# Patient Record
Sex: MALE | Race: ASIAN | Employment: UNEMPLOYED | URBAN - METROPOLITAN AREA
[De-identification: names, ages, dates, MRNs, and addresses within clinical notes are randomized per-mention and may not be internally consistent; named-entity substitution may affect disease eponyms.]

---
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
## 2024-12-05 ENCOUNTER — OFFICE VISIT (OUTPATIENT)
Dept: FAMILY MEDICINE | Facility: CLINIC | Age: 19
End: 2024-12-05
Payer: COMMERCIAL

## 2024-12-05 VITALS
DIASTOLIC BLOOD PRESSURE: 80 MMHG | BODY MASS INDEX: 25.74 KG/M2 | SYSTOLIC BLOOD PRESSURE: 124 MMHG | TEMPERATURE: 97.4 F | HEIGHT: 64 IN | WEIGHT: 150.8 LBS | HEART RATE: 68 BPM | RESPIRATION RATE: 20 BRPM | OXYGEN SATURATION: 99 %

## 2024-12-05 DIAGNOSIS — L70.0 ACNE VULGARIS: Primary | ICD-10-CM

## 2024-12-05 RX ORDER — TRETINOIN 0.25 MG/G
CREAM TOPICAL AT BEDTIME
Qty: 20 G | Refills: 0 | Status: SHIPPED | OUTPATIENT
Start: 2024-12-05

## 2024-12-05 ASSESSMENT — PAIN SCALES - GENERAL: PAINLEVEL_OUTOF10: NO PAIN (0)

## 2024-12-05 NOTE — PROGRESS NOTES
"  Assessment & Plan     Acne vulgaris  Acne is concentrated on face specifically cheek bone area and mild on forehead. Patient reports worse in last 7-8 months. Right cheek bone are has pitting and possible scarring. Started patient on mild acne treatment and advised to find PCP and return in 2 to 3 months for annual wellness visit and have acne treatment assessed.     - tretinoin (RETIN-A) 0.025 % external cream; Apply topically at bedtime.  - benzoyl peroxide 5 % external liquid; Jeison Cadet  - Adult Dermatology  Referral; Future    BMI  Estimated body mass index is 25.88 kg/m  as calculated from the following:    Height as of this encounter: 1.626 m (5' 4\").    Weight as of this encounter: 68.4 kg (150 lb 12.8 oz).     CONSULTATION/REFERRAL to Dermatology    Subjective   Jeison is a 19 year old, presenting for the following health issues:  Acne      12/5/2024     2:51 PM   Additional Questions   Roomed by Christy BASHIR CMA     Acne is concentrated on face specifically cheek bone area and mild on forehead. Patient reports worse in last 7-8 months. Right cheek bone are has pitting and possible scarring. Patient reports he sometimes itches acne. Started patient on mild acne treatment and advised to find PCP and return in 2 to 3 months for annual wellness visit and have acne treatment assessed. Patient denies headache, pain, and nausea.       History of Present Illness       Reason for visit:  Acne-face  What makes it better:  Patient is using a daily facewash and lotion through [employer], an emulsion cream, and a skin brightener through [employer]   He is taking medications regularly.     Review of Systems  CONSTITUTIONAL: NEGATIVE for fever, chills, change in weight  ENT/MOUTH: NEGATIVE for ear, mouth and throat problems  RESP: NEGATIVE for significant cough or SOB  CV: NEGATIVE for chest pain, palpitations or peripheral edema      Objective    There were no vitals taken for this visit.  There is no height or weight on " file to calculate BMI.    Physical Exam   GENERAL: alert and no distress  NECK: no adenopathy, no asymmetry, masses, or scars  RESP: lungs clear to auscultation - no rales, rhonchi or wheezes  CV: regular rate and rhythm, normal S1 S2, no S3 or S4, no murmur, click or rub, no peripheral edema  MS: no gross musculoskeletal defects noted, no edema          Signed Electronically by: RIELY Bowen CNP

## 2024-12-09 ENCOUNTER — TELEPHONE (OUTPATIENT)
Dept: FAMILY MEDICINE | Facility: CLINIC | Age: 19
End: 2024-12-09
Payer: COMMERCIAL

## 2024-12-09 DIAGNOSIS — L70.0 ACNE VULGARIS: ICD-10-CM

## 2024-12-09 NOTE — TELEPHONE ENCOUNTER
Please notify Pharmacy of instructions for benzoyl peroxide 5 % external liquid. Thank you    Instruction for Pharmacy to fill medication:    Once or twice daily depending on severity of acne.     Medication may bleach hair or clothing.         Pharmacy:    [facility]/pharmacy #[id number] - [place] - [address] [place] [address] AT [address] & [facility]  [address] [place] [address] [place] [address]  Phone: [phone] Fax: [phone]      Thank you     With Kind Regards,    RILEY Bowen CNP         Your urine culture confirmed that the antibiotic you took, Cipro, was effective against the bacteria causing your UTI.

## 2024-12-09 NOTE — TELEPHONE ENCOUNTER
"  Medication Question or Refill        What medication are you calling about (include dose and sig)?: benzoyl peroxide 5 % external liquid     Preferred Pharmacy:   [facility]/pharmacy #[id number] - [place] - [address] [place] [address] AT [address]  [place] [address]  Phone: [phone] Fax: [phone]      Controlled Substance Agreement on file:   CSA -- Patient Level:    CSA: None found at the patient level.       Who prescribed the medication?: Patricio    Do you have any questions or concerns?  Yes: faxed received from pharmacy with the following message   \"Directions cannot be patients name. Please send in a new prescription with directions on how to use the medication so we can legally bill insurance properly\"          "

## 2024-12-10 NOTE — TELEPHONE ENCOUNTER
Danyel Thompson,    Pharmacy will likely need a new order, so I pended that with your instructions for your signature.    Thanks,  Claudine Holguin RN  [facility]